# Patient Record
Sex: MALE | Race: WHITE | Employment: OTHER | URBAN - NONMETROPOLITAN AREA
[De-identification: names, ages, dates, MRNs, and addresses within clinical notes are randomized per-mention and may not be internally consistent; named-entity substitution may affect disease eponyms.]

---

## 2018-01-19 ENCOUNTER — HOSPITAL ENCOUNTER (OUTPATIENT)
Dept: ULTRASOUND IMAGING | Age: 78
Discharge: HOME | End: 2018-01-19
Payer: MEDICARE

## 2018-01-19 ENCOUNTER — HOSPITAL ENCOUNTER (OUTPATIENT)
Age: 78
Discharge: HOME | End: 2018-01-19
Payer: MEDICARE

## 2018-01-19 DIAGNOSIS — N18.4 CHRONIC KIDNEY DISEASE, STAGE IV (SEVERE) (HCC): ICD-10-CM

## 2018-01-19 LAB
-: ABNORMAL
ALBUMIN SERPL-MCNC: 4.3 G/DL (ref 3.5–5.2)
AMORPHOUS: ABNORMAL
ANION GAP SERPL CALCULATED.3IONS-SCNC: 12 MMOL/L (ref 9–17)
BACTERIA: ABNORMAL
BILIRUBIN URINE: NEGATIVE
BUN BLDV-MCNC: 40 MG/DL (ref 8–23)
BUN/CREAT BLD: 18 (ref 9–20)
CALCIUM SERPL-MCNC: 9 MG/DL (ref 8.6–10.4)
CASTS UA: ABNORMAL /LPF
CHLORIDE BLD-SCNC: 100 MMOL/L (ref 98–107)
CO2: 27 MMOL/L (ref 20–31)
COLOR: YELLOW
COMMENT UA: ABNORMAL
CREAT SERPL-MCNC: 2.2 MG/DL (ref 0.7–1.2)
CREAT SERPL-MCNC: 2.2 MG/DL (ref 0.7–1.2)
CREATININE CLEARANCE: 25.5 ML/MIN/BSA (ref 71–151)
CREATININE URINE: 34.1 MG/DL (ref 39–259)
CREATININE URINE: 34.1 MG/DL (ref 39–259)
CRYSTALS, UA: ABNORMAL /HPF
EPITHELIAL CELLS UA: ABNORMAL /HPF (ref 0–5)
GFR AFRICAN AMERICAN: 35 ML/MIN
GFR NON-AFRICAN AMERICAN: 29 ML/MIN
GFR SERPL CREATININE-BSD FRML MDRD: ABNORMAL ML/MIN/{1.73_M2}
GFR SERPL CREATININE-BSD FRML MDRD: ABNORMAL ML/MIN/{1.73_M2}
GLUCOSE BLD-MCNC: 131 MG/DL (ref 70–99)
GLUCOSE URINE: NEGATIVE
HCT VFR BLD CALC: 39.7 % (ref 41–53)
HEMOGLOBIN: 12.7 G/DL (ref 13.5–17)
HOURS COLLECTED: 24 H
KETONES, URINE: NEGATIVE
LENGTH OF COLLECTION: 24 H
LEUKOCYTE ESTERASE, URINE: NEGATIVE
MAGNESIUM: 2.4 MG/DL (ref 1.6–2.6)
MCH RBC QN AUTO: 31 PG (ref 26–34)
MCHC RBC AUTO-ENTMCNC: 32.1 G/DL (ref 31–37)
MCV RBC AUTO: 96.8 FL (ref 80–100)
MUCUS: ABNORMAL
NITRITE, URINE: NEGATIVE
NRBC AUTOMATED: ABNORMAL PER 100 WBC
OTHER OBSERVATIONS UA: ABNORMAL
PATIENT HEIGHT: 180 CM
PATIENT WEIGHT: 88 KG
PDW BLD-RTO: 13.5 % (ref 12.1–15.2)
PH UA: 6 (ref 5–9)
PHOSPHORUS: 3.4 MG/DL (ref 2.5–4.5)
PLATELET # BLD: 229 K/UL (ref 140–450)
PMV BLD AUTO: 8.3 FL (ref 6–12)
POTASSIUM SERPL-SCNC: 4.6 MMOL/L (ref 3.7–5.3)
PROTEIN 24 HOUR URINE: 371 MG/24 H
PROTEIN UA: NEGATIVE
PROTEIN,TOT TIMED UR: ABNORMAL MG/X H
PTH INTACT: 27.01 PG/ML (ref 15–65)
RBC # BLD: 4.1 M/UL (ref 4.5–5.9)
RBC UA: ABNORMAL /HPF (ref 0–2)
RENAL EPITHELIAL, UA: ABNORMAL /HPF
SODIUM BLD-SCNC: 139 MMOL/L (ref 135–144)
SPECIFIC GRAVITY UA: <1.005 (ref 1.01–1.02)
TOTAL PROTEIN, URINE: 12 MG/DL
TRICHOMONAS: ABNORMAL
TSH SERPL DL<=0.05 MIU/L-ACNC: 1.75 MIU/L (ref 0.3–5)
TURBIDITY: CLEAR
URIC ACID: 7.4 MG/DL (ref 3.4–7)
URINE HGB: NEGATIVE
URINE TOTAL PROTEIN: 13 MG/DL
UROBILINOGEN, URINE: NORMAL
VOLUME: 2850 ML
VOLUME: 2850 ML
WBC # BLD: 7.3 K/UL (ref 3.5–11)
WBC UA: ABNORMAL /HPF (ref 0–5)
YEAST: ABNORMAL

## 2018-01-19 PROCEDURE — 84156 ASSAY OF PROTEIN URINE: CPT

## 2018-01-19 PROCEDURE — 76770 US EXAM ABDO BACK WALL COMP: CPT

## 2018-01-19 PROCEDURE — 80069 RENAL FUNCTION PANEL: CPT

## 2018-01-19 PROCEDURE — 85027 COMPLETE CBC AUTOMATED: CPT

## 2018-01-19 PROCEDURE — 81001 URINALYSIS AUTO W/SCOPE: CPT

## 2018-01-19 PROCEDURE — 84550 ASSAY OF BLOOD/URIC ACID: CPT

## 2018-01-19 PROCEDURE — 36415 COLL VENOUS BLD VENIPUNCTURE: CPT

## 2018-01-19 PROCEDURE — 84443 ASSAY THYROID STIM HORMONE: CPT

## 2018-01-19 PROCEDURE — 82575 CREATININE CLEARANCE TEST: CPT

## 2018-01-19 PROCEDURE — 83970 ASSAY OF PARATHORMONE: CPT

## 2018-01-19 PROCEDURE — 82570 ASSAY OF URINE CREATININE: CPT

## 2018-01-19 PROCEDURE — 83735 ASSAY OF MAGNESIUM: CPT

## 2020-01-01 ENCOUNTER — HOSPITAL ENCOUNTER (EMERGENCY)
Age: 80
Discharge: HOME | End: 2020-11-12
Attending: EMERGENCY MEDICINE
Payer: MEDICARE

## 2020-01-01 ENCOUNTER — CARE COORDINATION (OUTPATIENT)
Dept: CARE COORDINATION | Age: 80
End: 2020-01-01

## 2020-01-01 ENCOUNTER — HOSPITAL ENCOUNTER (EMERGENCY)
Age: 80
End: 2020-11-16
Attending: EMERGENCY MEDICINE
Payer: MEDICARE

## 2020-01-01 ENCOUNTER — APPOINTMENT (OUTPATIENT)
Dept: GENERAL RADIOLOGY | Age: 80
End: 2020-01-01
Payer: MEDICARE

## 2020-01-01 ENCOUNTER — HOSPITAL ENCOUNTER (OUTPATIENT)
Dept: LAB | Age: 80
Setting detail: SPECIMEN
Discharge: HOME | End: 2020-11-11
Payer: MEDICARE

## 2020-01-01 VITALS
DIASTOLIC BLOOD PRESSURE: 70 MMHG | HEART RATE: 88 BPM | OXYGEN SATURATION: 97 % | WEIGHT: 189 LBS | RESPIRATION RATE: 22 BRPM | SYSTOLIC BLOOD PRESSURE: 173 MMHG | BODY MASS INDEX: 25.6 KG/M2 | TEMPERATURE: 99 F | HEIGHT: 72 IN

## 2020-01-01 LAB
ABSOLUTE EOS #: 0 K/UL (ref 0–0.44)
ABSOLUTE IMMATURE GRANULOCYTE: 0 K/UL (ref 0–0.3)
ABSOLUTE LYMPH #: 0.47 K/UL (ref 1.1–3.7)
ABSOLUTE MONO #: 0.67 K/UL (ref 0.1–1.2)
ALBUMIN SERPL-MCNC: 3.3 G/DL (ref 3.5–5.2)
ALBUMIN/GLOBULIN RATIO: 1 (ref 1–2.5)
ALP BLD-CCNC: 50 U/L (ref 40–129)
ALT SERPL-CCNC: 15 U/L (ref 5–41)
ANION GAP SERPL CALCULATED.3IONS-SCNC: 9 MMOL/L (ref 9–17)
AST SERPL-CCNC: 20 U/L
BASOPHILS # BLD: 0 % (ref 0–2)
BASOPHILS ABSOLUTE: 0 K/UL (ref 0–0.2)
BILIRUB SERPL-MCNC: 0.21 MG/DL (ref 0.3–1.2)
BNP INTERPRETATION: ABNORMAL
BUN BLDV-MCNC: 27 MG/DL (ref 8–23)
BUN/CREAT BLD: 14 (ref 9–20)
CALCIUM SERPL-MCNC: 10.5 MG/DL (ref 8.6–10.4)
CHLORIDE BLD-SCNC: 103 MMOL/L (ref 98–107)
CHP ED QC CHECK: YES
CO2: 27 MMOL/L (ref 20–31)
CREAT SERPL-MCNC: 1.9 MG/DL (ref 0.7–1.2)
CULTURE: ABNORMAL
DIFFERENTIAL TYPE: ABNORMAL
EKG ATRIAL RATE: 340 BPM
EKG Q-T INTERVAL: 332 MS
EKG QRS DURATION: 100 MS
EKG QTC CALCULATION (BAZETT): 430 MS
EKG R AXIS: 55 DEGREES
EKG T AXIS: 69 DEGREES
EKG VENTRICULAR RATE: 101 BPM
EOSINOPHILS RELATIVE PERCENT: 0 % (ref 1–4)
GFR AFRICAN AMERICAN: 41 ML/MIN
GFR NON-AFRICAN AMERICAN: 34 ML/MIN
GFR SERPL CREATININE-BSD FRML MDRD: ABNORMAL ML/MIN/{1.73_M2}
GFR SERPL CREATININE-BSD FRML MDRD: ABNORMAL ML/MIN/{1.73_M2}
GLUCOSE BLD-MCNC: 109 MG/DL
GLUCOSE BLD-MCNC: 109 MG/DL (ref 74–100)
GLUCOSE BLD-MCNC: 29 MG/DL (ref 70–99)
HCT VFR BLD CALC: 29.2 % (ref 40.7–50.3)
HEMOGLOBIN: 9.2 G/DL (ref 13–17)
IMMATURE GRANULOCYTES: 0 %
LACTIC ACID, SEPSIS WHOLE BLOOD: NORMAL MMOL/L (ref 0.5–1.9)
LACTIC ACID, SEPSIS: 0.6 MMOL/L (ref 0.5–1.9)
LYMPHOCYTES # BLD: 7 % (ref 24–43)
Lab: ABNORMAL
MAGNESIUM: 1.9 MG/DL (ref 1.6–2.6)
MCH RBC QN AUTO: 29.9 PG (ref 25.2–33.5)
MCHC RBC AUTO-ENTMCNC: 31.5 G/DL (ref 28.4–34.8)
MCV RBC AUTO: 94.8 FL (ref 82.6–102.9)
MONOCYTES # BLD: 10 % (ref 3–12)
MORPHOLOGY: NORMAL
NRBC AUTOMATED: 0 PER 100 WBC
PDW BLD-RTO: 14.2 % (ref 11.8–14.4)
PLATELET # BLD: 253 K/UL (ref 138–453)
PLATELET ESTIMATE: ABNORMAL
PMV BLD AUTO: 9.7 FL (ref 8.1–13.5)
POTASSIUM SERPL-SCNC: 3.3 MMOL/L (ref 3.7–5.3)
PRO-BNP: 2018 PG/ML
RBC # BLD: 3.08 M/UL (ref 4.21–5.77)
RBC # BLD: ABNORMAL 10*6/UL
SARS-COV-2, RAPID: DETECTED
SARS-COV-2: ABNORMAL
SARS-COV-2: ABNORMAL
SEG NEUTROPHILS: 83 % (ref 36–65)
SEGMENTED NEUTROPHILS ABSOLUTE COUNT: 5.56 K/UL (ref 1.5–8.1)
SODIUM BLD-SCNC: 139 MMOL/L (ref 135–144)
SOURCE: ABNORMAL
SPECIMEN DESCRIPTION: ABNORMAL
TOTAL PROTEIN: 6.6 G/DL (ref 6.4–8.3)
TROPONIN INTERP: ABNORMAL
TROPONIN INTERP: ABNORMAL
TROPONIN T: ABNORMAL NG/ML
TROPONIN T: ABNORMAL NG/ML
TROPONIN, HIGH SENSITIVITY: 157 NG/L (ref 0–22)
TROPONIN, HIGH SENSITIVITY: 168 NG/L (ref 0–22)
WBC # BLD: 6.7 K/UL (ref 3.5–11.3)
WBC # BLD: ABNORMAL 10*3/UL

## 2020-01-01 PROCEDURE — 92950 HEART/LUNG RESUSCITATION CPR: CPT

## 2020-01-01 PROCEDURE — 71045 X-RAY EXAM CHEST 1 VIEW: CPT

## 2020-01-01 PROCEDURE — 80053 COMPREHEN METABOLIC PANEL: CPT

## 2020-01-01 PROCEDURE — 84484 ASSAY OF TROPONIN QUANT: CPT

## 2020-01-01 PROCEDURE — U0002 COVID-19 LAB TEST NON-CDC: HCPCS

## 2020-01-01 PROCEDURE — 87040 BLOOD CULTURE FOR BACTERIA: CPT

## 2020-01-01 PROCEDURE — 83605 ASSAY OF LACTIC ACID: CPT

## 2020-01-01 PROCEDURE — U0003 INFECTIOUS AGENT DETECTION BY NUCLEIC ACID (DNA OR RNA); SEVERE ACUTE RESPIRATORY SYNDROME CORONAVIRUS 2 (SARS-COV-2) (CORONAVIRUS DISEASE [COVID-19]), AMPLIFIED PROBE TECHNIQUE, MAKING USE OF HIGH THROUGHPUT TECHNOLOGIES AS DESCRIBED BY CMS-2020-01-R: HCPCS

## 2020-01-01 PROCEDURE — 99283 EMERGENCY DEPT VISIT LOW MDM: CPT

## 2020-01-01 PROCEDURE — 36415 COLL VENOUS BLD VENIPUNCTURE: CPT

## 2020-01-01 PROCEDURE — 83880 ASSAY OF NATRIURETIC PEPTIDE: CPT

## 2020-01-01 PROCEDURE — 6360000002 HC RX W HCPCS: Performed by: EMERGENCY MEDICINE

## 2020-01-01 PROCEDURE — 2580000003 HC RX 258: Performed by: EMERGENCY MEDICINE

## 2020-01-01 PROCEDURE — 83735 ASSAY OF MAGNESIUM: CPT

## 2020-01-01 PROCEDURE — 82947 ASSAY GLUCOSE BLOOD QUANT: CPT

## 2020-01-01 PROCEDURE — 85025 COMPLETE CBC W/AUTO DIFF WBC: CPT

## 2020-01-01 PROCEDURE — 93005 ELECTROCARDIOGRAM TRACING: CPT | Performed by: EMERGENCY MEDICINE

## 2020-01-01 PROCEDURE — 87205 SMEAR GRAM STAIN: CPT

## 2020-01-01 PROCEDURE — C9803 HOPD COVID-19 SPEC COLLECT: HCPCS

## 2020-01-01 PROCEDURE — 2700000000 HC OXYGEN THERAPY PER DAY

## 2020-01-01 PROCEDURE — 99285 EMERGENCY DEPT VISIT HI MDM: CPT

## 2020-01-01 PROCEDURE — 96372 THER/PROPH/DIAG INJ SC/IM: CPT

## 2020-01-01 PROCEDURE — 94761 N-INVAS EAR/PLS OXIMETRY MLT: CPT

## 2020-01-01 PROCEDURE — 93010 ELECTROCARDIOGRAM REPORT: CPT | Performed by: INTERNAL MEDICINE

## 2020-01-01 RX ORDER — LOSARTAN POTASSIUM 50 MG/1
50 TABLET ORAL DAILY
COMMUNITY

## 2020-01-01 RX ORDER — ACETAMINOPHEN 500 MG
1000 TABLET ORAL EVERY 6 HOURS PRN
COMMUNITY

## 2020-01-01 RX ORDER — TAMSULOSIN HYDROCHLORIDE 0.4 MG/1
0.4 CAPSULE ORAL DAILY
COMMUNITY

## 2020-01-01 RX ORDER — HALOPERIDOL 5 MG/ML
5 INJECTION INTRAMUSCULAR ONCE
Status: COMPLETED | OUTPATIENT
Start: 2020-01-01 | End: 2020-01-01

## 2020-01-01 RX ORDER — DOXAZOSIN 2 MG/1
4 TABLET ORAL NIGHTLY
COMMUNITY

## 2020-01-01 RX ORDER — INSULIN GLARGINE 300 U/ML
50 INJECTION, SOLUTION SUBCUTANEOUS
COMMUNITY
End: 2020-01-01

## 2020-01-01 RX ORDER — DEXTROSE MONOHYDRATE 25 G/50ML
25 INJECTION, SOLUTION INTRAVENOUS ONCE
Status: COMPLETED | OUTPATIENT
Start: 2020-01-01 | End: 2020-01-01

## 2020-01-01 RX ORDER — HALOPERIDOL 5 MG/ML
5 INJECTION INTRAMUSCULAR ONCE
Status: DISCONTINUED | OUTPATIENT
Start: 2020-01-01 | End: 2020-01-01 | Stop reason: HOSPADM

## 2020-01-01 RX ORDER — AZELASTINE 1 MG/ML
1 SPRAY, METERED NASAL 2 TIMES DAILY PRN
COMMUNITY

## 2020-01-01 RX ORDER — MEMANTINE HYDROCHLORIDE 5 MG/1
5 TABLET ORAL DAILY
COMMUNITY

## 2020-01-01 RX ORDER — VITAMIN B COMPLEX
1 CAPSULE ORAL DAILY
COMMUNITY

## 2020-01-01 RX ORDER — LORATADINE 10 MG/1
10 TABLET ORAL DAILY PRN
COMMUNITY

## 2020-01-01 RX ORDER — INSULIN GLARGINE 300 U/ML
48 INJECTION, SOLUTION SUBCUTANEOUS DAILY
COMMUNITY

## 2020-01-01 RX ADMIN — DEXTROSE MONOHYDRATE 25 G: 25 INJECTION, SOLUTION INTRAVENOUS at 10:12

## 2020-01-01 RX ADMIN — HALOPERIDOL LACTATE 5 MG: 5 INJECTION, SOLUTION INTRAMUSCULAR at 11:34

## 2020-11-12 NOTE — ED NOTES
Pt pulling NC off, this writer redirected pt to keep NC on to assist with oxygen level     Afsaneh Mae RN  11/12/20 1198

## 2020-11-12 NOTE — ED NOTES
Bed: 03  Expected date:   Expected time:   Means of arrival:   Comments:     Christal Carr RN  11/12/20 7472

## 2020-11-12 NOTE — ED NOTES
Dr. Alison Iglesias made aware that patient is still 87-89% on 5lpm NC, and that this is because pt falls asleep.  When pt is awakened and instructed to take some deep breaths his O2 saturations come up to 91-92%     Monica Chowdhury RN  11/12/20 4141

## 2020-11-12 NOTE — ED NOTES
Per Dr. Connie Paul, wife wants to take patient home on hospice.   Dr. Connie Paul writes advanced directives for DNR-CCA     Tj Sinha RN  11/12/20 7725

## 2020-11-12 NOTE — ED NOTES
Per the hospice nurse, the hospice physician wondered if we would admit pt overnight. Hospice nurse spoke with wife who declines to admit pt and just wants to take pt home.       Lj Lopez RN  11/12/20 3193

## 2020-11-12 NOTE — PROGRESS NOTES
Received a phone call the wife wants hospice. [place] [facility] and referral made. Paper work fax to them. Hospice will call back with a time when they can come.     VAL Bradley

## 2020-11-12 NOTE — ED NOTES
Dr Sawyer Puente called at office. Per [facility] he is not in the office.  Text sent to Dr Nalini Lai to call back for a Hospitalist patient     Azalea Bray  11/12/20 3573

## 2020-11-12 NOTE — ED NOTES
Clay Pena called. Patient's wife is wanting Hospice for the patient.  She will make calls and get back with us     [facility]  11/12/20 4229

## 2020-11-12 NOTE — ED NOTES
Pt and wife updated on glucose level and need for D50.  Wife states pt hasn't been eating well and that he did not receive any insulin this morning     Tj Sinha RN  11/12/20 8403

## 2020-11-12 NOTE — ED NOTES
Per Hospice nurse patient has been accepted to hospice. We will attempt to arrange transportation home as he needs O2.       Brandi Delgado RN  11/12/20 6559

## 2020-11-12 NOTE — ED NOTES
Per pt and hospice nurse request, this writer called and updated Elida Nyhan CNP that pt is going home on hospice.   She appreciated the update and stated that she will call the wife in the morning     Kate Chamberlain RN  11/12/20 3419

## 2020-11-12 NOTE — ED NOTES
Pt has to constantly be reminded to leave O2 NC on for comfort, pt has been redirected to stay in bed and boosted multiple times.      Lj Lopez RN  11/12/20 8522

## 2020-11-12 NOTE — PROGRESS NOTES
[facility] will be here at 3:30 Pm to meet with wife and patient.     VAL Rangel

## 2020-11-12 NOTE — ED NOTES
Dr. Shannan Puentes at bedside talking with pt and wife, per wife pt would want to be a full code     Lj Lopez RN  11/12/20 4614

## 2020-11-12 NOTE — ED NOTES
[facility] called spoke with Aliya Sullivan to cancel transfer to [facility] as family wishes for Hospice.      Kingsley Babinski  11/12/20 3039

## 2020-11-12 NOTE — ED NOTES
Lunch tray ordered for pt's wife.  Pt currently sleeping at this time     Afsaneh Mae RN  11/12/20 1257

## 2020-11-12 NOTE — ED NOTES
Burak Manriquez RT called per Dr. Shannan Puentes request for high flow [address] [facility]  11/12/20 8360

## 2020-11-12 NOTE — ACP (ADVANCE CARE PLANNING)
Advance Care Planning     Advance Care Planning Activator (Inpatient)  Conversation Note      Date of ACP Conversation: 11/12/2020    Conversation Conducted with:  Healthcare Decision Maker: Next of Kin by law (only applies in absence of above) (name) Anuja Cohen Activator: [id number] Decision Maker:     Current Designated Health Care Decision Maker:       If no Authorized Decision Maker has previously been identified, then patient chooses [address]:  \"Who would you like to name as your primary health care decision-maker? \"               Name: Zulma Govea         Relationship: spouse          Phone number: [phone]  Will [place] this person be reached easily? \" Yes  \"Who would you like to name as your back-up decision maker? \"   Name: Jeronimo Arellano        Relationship: son          Phone number: [phone]  Will [place] this person be reached easily? \" Yes    Note: If the relationship of these Decision-Makers to the patient does NOT follow your state's Next of Kin hierarchy, recommend that patient complete ACP document that meets state-specific requirements to allow them to act on the patient's behalf when appropriate. Care Preferences    Ventilation: \"If you were in your present state of health and suddenly became very ill and were unable to breathe on your own, what would your preference be about the use of a ventilator (breathing machine) if it were available to you? \"      Would the patient desire the use of ventilator (breathing machine)?: no    \"If your health worsens and it becomes clear that your chance of recovery is unlikely, what would your preference be about the use of a ventilator (breathing machine) if it were available to you? \"     Would the patient desire the use of ventilator (breathing machine)?: No      Resuscitation  \"CPR works best to restart the heart when there is a sudden event, like a heart attack, in someone who is otherwise healthy.  Unfortunately, CPR does not typically

## 2020-11-12 NOTE — ED NOTES
Dr. Patrick Willoughby made aware of critical troponin of [facility]  11/12/20 7790 [facility]

## 2020-11-12 NOTE — ED NOTES
[facility] called spoke with Sebas Small RN to initiate transfer to [facility] [employer]  11/12/20 6087

## 2020-11-12 NOTE — ED PROVIDER NOTES
[facility] ED  EMERGENCY DEPARTMENT ENCOUNTER      Pt Name: Junie Cazares  MRN: [id number]  [place] 1940  Date of evaluation: 11/12/2020  Provider: Moise Shay MD    CHIEF COMPLAINT       Chief Complaint   Patient presents with    Fall     fell this AM at 0600; refused tx; call back later c/o back pain. no c/o offered now    Cough     Ambulatory CoVid test yesterday; moist productive cough noted    Shortness of Breath     room air SaO2 @ 82%         HISTORY OF PRESENT ILLNESS   (Location/Symptom, Timing/Onset, Context/Setting, Quality, Duration, Modifying Factors, Severity)  Note limiting factors. Junie Cazares is a 78 y.o. male who presents to the emergency department      78-year-old gentleman with emergency department by EMS for generalized weakness and a fall this morning. Patient reportedly fallen at home in the kitchen. His wife heard him fall. She found him he was awake but unable to stand. They did call EMS to get him back into bed. Patient and his wife refused transport to the hospital at that time. This morning the patient was not able to get bed so his wife called 911 again. Patient was transported to the emergency department. On arrival his pulse ox was 82% on room air does have a history of dementia. Was awake. He was answering simple questions appropriately. Could not recall the details of the fall but was speaking in complete sentences and was comprehensible. Appropriate with examination. His wife states that since yesterday he had seemed to be fatigued and had a harsh cough. No known sick contacts. Localized pain complaints following the fall. No other acute concerns. Nursing Notes were reviewed. REVIEW OF SYSTEMS    (2-9 systems for level 4, 10 or more for level 5)     Review of Systems   Unable to perform ROS: Dementia       Except as noted above the remainder of the review of systems was reviewed and negative.        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    Vision abnormalities     DISTORTED LT EYE    Wears glasses     FOR READING ONLY         SURGICAL HISTORY       Past Surgical History:   Procedure Laterality Date    EYE SURGERY Left 06/29/2016    CATARACT EXTRACTION WITH IOL    EYE SURGERY Right 07/06/2016    CATARACT EXTRACTION WITH IOL    EYE SURGERY Left 07/19/2016    vitrectomy/gas bubble         CURRENT MEDICATIONS       Discharge Medication List as of 11/12/2020  7:42 PM      CONTINUE these medications which have NOT CHANGED    Details   acetaminophen (TYLENOL) 500 MG tablet Take 1,000 mg by mouth every 6 hours as needed for PainHistorical Med      doxazosin (CARDURA) 2 MG tablet Take 4 mg by mouth nightly Historical Med      b complex vitamins capsule Take 1 capsule by mouth dailyHistorical Med      tamsulosin (FLOMAX) 0.4 MG capsule Take 0.4 mg by mouth dailyHistorical Med      memantine (NAMENDA) 5 MG tablet Take 5 mg by mouth dailyHistorical Med      insulin aspart (NOVOLOG) 100 UNIT/ML injection vial Inject 5 Units into the skin 3 times daily (before meals) Historical Med      losartan (COZAAR) 50 MG tablet Take 50 mg by mouth dailyHistorical Med      Insulin Glargine, 2 Unit Dial, (TOUJEO MAX SOLOSTAR) 300 UNIT/ML SOPN Inject 48 Units into the skin dailyHistorical Med      rosuvastatin (CRESTOR) 10 MG tablet Take 10 mg by mouth nightly      PARoxetine (PAXIL) 10 MG tablet Take 10 mg by mouth nightly      amLODIPine (NORVASC) 2.5 MG tablet Take 2.5 mg by mouth daily Historical Med      calcium carbonate 600 MG TABS tablet Take 1 tablet by mouth 2 times daily Historical Med      Multiple Vitamins-Minerals (THERAPEUTIC MULTIVITAMIN-MINERALS) tablet Take 1 tablet by mouth nightly      Cholecalciferol (VITAMIN D3) 2000 UNITS CAPS Take 2,000 Units by mouth nightly      loratadine (CLARITIN) 10 MG tablet Take 10 mg by mouth daily as needed (seasonal allergies)Historical Med      azelastine (ASTELIN) 0.1 % nasal spray 1 spray by Nasal route 2 times daily as needed for Rhinitis (seasonal allergies) Use in each nostril as directedHistorical Med             ALLERGIES     Patient has no known allergies.     FAMILY HISTORY       Family History   Problem Relation Age of Onset    Diabetes Mother         Tutu Talbot High Blood Pressure Mother     Stroke Mother     Other Father 62        SUICIDE    Other Brother         SUICIDE IN HIS 40'S          SOCIAL HISTORY       Social History     Socioeconomic History    Marital status:      Spouse name: None    Number of children: None    Years of education: None    Highest education level: None   Occupational History    None   Social Needs    Financial resource strain: None    Food insecurity     Worry: None     Inability: None    Transportation needs     Medical: None     Non-medical: None   Tobacco Use    Smoking status: Former Smoker     Years: 51.00     Last attempt to quit: 7/18/2005     Years since quitting: 15.3    Tobacco comment: quit in 2005   Substance and Sexual Activity    Alcohol use: No    Drug use: No    Sexual activity: None   Lifestyle    Physical activity     Days per week: None     Minutes per session: None    Stress: None   Relationships    Social connections     Talks on phone: None     Gets together: None     Attends [employer] service: None     Active member of club or organization: None     Attends meetings of clubs or organizations: None     Relationship status: None    Intimate partner violence     Fear of current or ex partner: None     Emotionally abused: None     Physically abused: None     Forced sexual activity: None   Other Topics Concern    None   Social History Narrative    None       SCREENINGS        Brett Coma Scale  Eye Opening: Spontaneous  Best Verbal Response: Confused  Best Motor Response: Obeys commands  Brett Coma Scale Score: 14               PHYSICAL EXAM    (up to 7 for level 4, 8 or more for level 5)     ED Triage Vitals   BP Temp Temp Source Pulse Resp SpO2 Height Weight   11/12/20 0815 11/12/20 0815 11/12/20 0815 11/12/20 0815 11/12/20 0815 11/12/20 0815 11/12/20 0822 11/12/20 0822   (!) 163/54 99 °F (37.2 °C) Temporal 93 20 (!) 82 % 6' (1.829 m) 189 lb (85.7 kg)       Physical Exam  Vitals signs and nursing note reviewed. Constitutional:       Comments: he is afebrile. He is nontoxic-appearing. Does have a harsh coarse cough but is in no respiratory distress. HENT:      Head: Normocephalic and atraumatic. Neck:      Musculoskeletal: Normal range of motion and neck supple. Cardiovascular:      Rate and Rhythm: Normal rate and regular rhythm. Pulmonary:      Comments: Course Breath sounds. No wheezing. No retractions. Abdominal:      Palpations: Abdomen is soft. Musculoskeletal: Normal range of motion. Right lower leg: No edema. Left lower leg: No edema. Skin:     General: Skin is warm. Findings: No rash. Neurological:      Mental Status: He is alert. Comments: History of dementia patient is alert to person and place. In all extremities equally. DIAGNOSTIC RESULTS     EKG: All EKG's are interpreted by the Emergency Department Physician who either signs or Co-signs this chart in the absence of a cardiologist.        RADIOLOGY:   Non-plain film images such as CT, Ultrasound and MRI are read by the radiologist. Plain radiographic images are visualized and preliminarily interpreted by the emergency physician with the below findings:        Interpretation per the Radiologist below, if available at the time of this note:    XR CHEST PORTABLE   Final Result   Increased opacities throughout the right lung are nonspecific but suspected   to be on the basis of pneumonia.                ED BEDSIDE ULTRASOUND:   Performed by ED Physician - none    LABS:  Labs Reviewed   CULTURE, BLOOD 1 - Abnormal; Notable for the following components:       Result Value    Culture   (*)     Value: POSITIVE BLOOD CULTURE, RN NOTIFIED: Ash Shaw RN T ER 11/12/2020 AT 2200 RB ACG    Culture   (*)     Value: VIRIDANS STREPTOCOCCUS GROUP A single positive blood culture of coagulase negative Staphylocci, diptheroids, micrococci, Cutibacterium, viridans Streptocci, Bacillus, or Lactobacillus species should be interpreted with caution and viewed as a likely skin contaminant.     All other components within normal limits   COVID-19 - Abnormal; Notable for the following components:    SARS-CoV-2, Rapid DETECTED (*)     All other components within normal limits   CBC WITH AUTO DIFFERENTIAL - Abnormal; Notable for the following components:    RBC 3.08 (*)     Hemoglobin 9.2 (*)     Hematocrit 29.2 (*)     Seg Neutrophils 83 (*)     Lymphocytes 7 (*)     Eosinophils % 0 (*)     Absolute Lymph # 0.47 (*)     All other components within normal limits   COMPREHENSIVE METABOLIC PANEL W/ REFLEX TO MG FOR LOW K - Abnormal; Notable for the following components:    Glucose 29 (*)     BUN 27 (*)     CREATININE 1.90 (*)     Calcium 10.5 (*)     Potassium 3.3 (*)     Total Bilirubin 0.21 (*)     Alb 3.3 (*)     GFR Non- 34 (*)     GFR  41 (*)     All other components within normal limits   TROPONIN - Abnormal; Notable for the following components:    Troponin, High Sensitivity 168 (*)     All other components within normal limits   BRAIN NATRIURETIC PEPTIDE - Abnormal; Notable for the following components:    Pro-BNP 2,018 (*)     All other components within normal limits   GLUCOSE, WHOLE BLOOD - Abnormal; Notable for the following components:    POC Glucose 109 (*)     All other components within normal limits   TROPONIN - Abnormal; Notable for the following components:    Troponin, High Sensitivity 157 (*)     All other components within normal limits   POCT GLUCOSE - Normal   CULTURE, BLOOD 1   LACTATE, SEPSIS   MAGNESIUM       All other labs were within normal range or not returned as of this dictation. EMERGENCY DEPARTMENT COURSE and DIFFERENTIAL DIAGNOSIS/MDM:   Vitals:    Vitals:    11/12/20 1100 11/12/20 1130 11/12/20 1145 11/12/20 1200   BP: (!) 149/78 (!) 166/52 (!) 173/70    Pulse: 92 83 88    Resp: 18 17 22    Temp:       TempSrc:       SpO2: 90%   97%   Weight:       Height:             MDM  Number of Diagnoses or Management Options  Diagnosis management comments: EKG poor baseline sinus rhythm rate of 100 bpm.  Nonspecific intraventricular conduction delay. No acute ST segment or T wave findings. Poor baseline. Nonspecific EKG. No acute findings of ischemia or infarction. Covid positive. He had been placed on nasal cannula 5 L since saturation was still in the low 90s. We did place him on flow nasal cannula 30 L with significant improvement in oxygen saturation was 96 to 98%. Laboratory studies showed his glucose was 29. The patient had been awake and alert without murmurs or other symptoms of hypoglycemia. Wife states he has not eaten and she did not administer insulin today. He was given dextrose 50% a 5 g IV. repeate blood sugar was 151. Patient was positive for COVID-19. As troponin was elevated. No acute ischemia or infarction noted on EKG. patient been discussed with Dr. Nolan Salazar due to his elevated troponin and need for high flow nasal cannula patient was recommended to be transferred to [facility]. D[place]. I had discussed with the [facility] hospitalist we are waiting on a repeat troponin. This time the patient's wife and he decided he was going to go home and he did not wish to be hospitalized or transferred. Patient advocate was notified.   Decision was made patient his wife and with the patient advocate that he we will make him officially a DNR CC and after lengthy discussion with the patient and his wife

## 2020-11-13 NOTE — ED NOTES
Madisyn RN from hospice updated on + [facility]     [facility]  11/12/20 0331

## 2020-11-16 NOTE — CODE DOCUMENTATION
Pt arrived on compression device. Receiving bag respirations. Pt is known to ER staff. DNR documentation was signed earlier this week.  Staff searching for copy of documentation

## 2020-11-16 NOTE — ED NOTES
Called Dr Delilah Paige as  via office phone, waiting for call back.       Simeon Santos  11/16/20 1956

## 2020-11-16 NOTE — ED NOTES
Called Lamar Thorpe via office, connected call to Dr Gavin Krishnamurthy.       Lore Likes  11/16/20 8969

## 2020-11-16 NOTE — ED NOTES
Dr Scar Deacon called back, connected call to Dr [employer].      Earline Fabry  11/16/20 7525

## 2020-11-16 NOTE — ED NOTES
Patient was prepped appropriately with hydrogen peroxide soaked gauze to mouth and CLAUDETTE nares, placed in body bag as patient was COVID positive. Patient Andreydelilah [place]) did have appropriate ID bracelet. Patient released after proper signage to [facility]  home.      Zahraa Grove RN  20 1730

## 2020-11-17 LAB
CULTURE: NORMAL
Lab: NORMAL
SARS-COV-2, NAA: DETECTED
SPECIMEN DESCRIPTION: NORMAL

## 2020-11-17 NOTE — ED PROVIDER NOTES
Left 07/19/2016    vitrectomy/gas bubble         CURRENT MEDICATIONS       Discharge Medication List as of 11/16/2020 12:44 PM      CONTINUE these medications which have NOT CHANGED    Details   acetaminophen (TYLENOL) 500 MG tablet Take 1,000 mg by mouth every 6 hours as needed for PainHistorical Med      doxazosin (CARDURA) 2 MG tablet Take 4 mg by mouth nightly Historical Med      b complex vitamins capsule Take 1 capsule by mouth dailyHistorical Med      tamsulosin (FLOMAX) 0.4 MG capsule Take 0.4 mg by mouth dailyHistorical Med      memantine (NAMENDA) 5 MG tablet Take 5 mg by mouth dailyHistorical Med      insulin aspart (NOVOLOG) 100 UNIT/ML injection vial Inject 5 Units into the skin 3 times daily (before meals) Historical Med      loratadine (CLARITIN) 10 MG tablet Take 10 mg by mouth daily as needed (seasonal allergies)Historical Med      azelastine (ASTELIN) 0.1 % nasal spray 1 spray by Nasal route 2 times daily as needed for Rhinitis (seasonal allergies) Use in each nostril as directedHistorical Med      losartan (COZAAR) 50 MG tablet Take 50 mg by mouth dailyHistorical Med      Insulin Glargine, 2 Unit Dial, (TOUJEO MAX SOLOSTAR) 300 UNIT/ML SOPN Inject 48 Units into the skin dailyHistorical Med      rosuvastatin (CRESTOR) 10 MG tablet Take 10 mg by mouth nightly      PARoxetine (PAXIL) 10 MG tablet Take 10 mg by mouth nightly      amLODIPine (NORVASC) 2.5 MG tablet Take 2.5 mg by mouth daily Historical Med      calcium carbonate 600 MG TABS tablet Take 1 tablet by mouth 2 times daily Historical Med      Multiple Vitamins-Minerals (THERAPEUTIC MULTIVITAMIN-MINERALS) tablet Take 1 tablet by mouth nightly      Cholecalciferol (VITAMIN D3) 2000 UNITS CAPS Take 2,000 Units by mouth nightly             ALLERGIES     Patient has no known allergies.     FAMILY HISTORY       Family History   Problem Relation Age of Onset    Diabetes Mother         Vora Mess High Blood Pressure Mother     Stroke Mother  Other Father 62        SUICIDE    Other Brother         SUICIDE IN HIS 39'S          SOCIAL HISTORY       Social History     Socioeconomic History    Marital status:      Spouse name: Not on file    Number of children: Not on file    Years of education: Not on file    Highest education level: Not on file   Occupational History    Not on file   Social Needs    Financial resource strain: Not on file    Food insecurity     Worry: Not on file     Inability: Not on file    Transportation needs     Medical: Not on file     Non-medical: Not on file   Tobacco Use    Smoking status: Former Smoker     Years: 51.00     Last attempt to quit: 7/18/2005     Years since quitting: 15.3    Tobacco comment: quit in 2005   Substance and Sexual Activity    Alcohol use: No    Drug use: No    Sexual activity: Not on file   Lifestyle    Physical activity     Days per week: Not on file     Minutes per session: Not on file    Stress: Not on file   Relationships    Social connections     Talks on phone: Not on file     Gets together: Not on file     Attends Anabaptism service: Not on file     Active member of club or organization: Not on file     Attends meetings of clubs or organizations: Not on file     Relationship status: Not on file    Intimate partner violence     Fear of current or ex partner: Not on file     Emotionally abused: Not on file     Physically abused: Not on file     Forced sexual activity: Not on file   Other Topics Concern    Not on file   Social History Narrative    Not on file       SCREENINGS                        PHYSICAL EXAM    (up to 7 for level 4, 8 or more for level 5)     ED Triage Vitals   BP Temp Temp src Pulse Resp SpO2 Height Weight   -- -- -- -- -- -- -- --       Physical Exam  Constitutional:       Comments: UnResponsive CPR in progress   HENT:      Head: Normocephalic and atraumatic. Eyes:      Comments: Pupils fixed and dilated.   No corneal reflex   Neck:      Comments: Trachea midline. No edema. No masses. Cardiovascular:      Comments: Pulseless, CPR in progress  Pulmonary:      Comments: BVM. No spontaneous respiratory effort  Abdominal:      General: There is no distension. Skin:     Comments: Pale and cool   Neurological:      Comments: Unresponsive, no protective reflexes         DIAGNOSTIC RESULTS     EKG: All EKG's are interpreted by the Emergency Department Physician who either signs or Co-signs this chart in the absence of a cardiologist.        RADIOLOGY:   Non-plain film images such as CT, Ultrasound and MRI are read by the radiologist. Plain radiographic images are visualized and preliminarily interpreted by the emergency physician with the below findings:        Interpretation per the Radiologist below, if available at the time of this note:    No orders to display         ED BEDSIDE ULTRASOUND:   Performed by ED Physician - none    LABS:  Labs Reviewed - No data to display    All other labs were within normal range or not returned as of this dictation. EMERGENCY DEPARTMENT COURSE and DIFFERENTIAL DIAGNOSIS/MDM:   Vitals: There were no vitals filed for this visit. MDM  Number of Diagnoses or Management Options  Diagnosis management comments: CPR initially continued. Patient was confirmed DNR cc. Family updated on situation and agreed with termination of resuscitative efforts. Please refer to nursing code chart for details and time of death. Discussed via telephone with primary care physician Dr. Samson Bonds. Dr Sandeep Parra,  also notified. REASSESSMENT          CRITICAL CARE TIME   Total Critical Care time was  minutes, excluding separately reportable procedures. There was a high probability of clinically significant/life threatening deterioration in the patient's condition which required my urgent intervention.       CONSULTS:  None    PROCEDURES:  Unless otherwise noted below, none     Procedures        FINAL IMPRESSION      1. Cardiorespiratory arrest ([facility] [address]    2. COVID-19    3. Hospice care patient          DISPOSITION/PLAN   DISPOSITION  2020 09:31:45 AM      PATIENT REFERRED TO:  No follow-up provider specified. DISCHARGE MEDICATIONS:  Discharge Medication List as of 2020 12:44 PM        Controlled Substances Monitoring:     No flowsheet data found.     (Please note that portions of this note were completed with a voice recognition program.  Efforts were made to edit the dictations but occasionally words are mis-transcribed.)    Olivia Arguelles MD (electronically signed)  Attending Emergency Physician             Olivia Arguelles MD  20 Polo Doty MD  20 Polo Doty MD  [id number]

## 2020-11-18 ENCOUNTER — TELEPHONE (OUTPATIENT)
Dept: ADMINISTRATIVE | Age: 80
End: 2020-11-18